# Patient Record
Sex: FEMALE | URBAN - METROPOLITAN AREA
[De-identification: names, ages, dates, MRNs, and addresses within clinical notes are randomized per-mention and may not be internally consistent; named-entity substitution may affect disease eponyms.]

---

## 2017-05-26 ENCOUNTER — IMPORTED ENCOUNTER (OUTPATIENT)
Dept: URBAN - METROPOLITAN AREA CLINIC 1 | Facility: CLINIC | Age: 57
End: 2017-05-26

## 2017-05-26 PROBLEM — H43.22: Noted: 2017-05-26

## 2017-05-26 PROBLEM — H04.123: Noted: 2017-05-26

## 2017-05-26 PROBLEM — H11.153: Noted: 2017-05-26

## 2017-05-26 PROBLEM — R73.09: Noted: 2017-05-26

## 2017-05-26 PROBLEM — H25.813: Noted: 2017-05-26

## 2017-05-26 PROCEDURE — 92014 COMPRE OPH EXAM EST PT 1/>: CPT

## 2017-05-26 PROCEDURE — 92015 DETERMINE REFRACTIVE STATE: CPT

## 2017-05-26 NOTE — PATIENT DISCUSSION
1.  DM Type II (Diet Controlled) without sign of diabetic retinopathy and no blot heme on dilated retinal examination today OU No Macular Edema:  Discussed the pathophysiology of diabetes and its effect on the eye and risk of blindness. Stressed the importance of strong glucose control. Advised of importance of at least yearly dilated examinations but to contact us immediately for any problems or concerns. 2. Cataract OU: Observe for now without intervention. The patient was advised to contact us if any change or worsening of vision3. Dry Eyes OU -- Recommended to patient to use Artificial Tears BID OU4. Pinguecula OU -- Use of sunglasses when exposed to UV light and observation is recommended. 5. Asteroid Hyalosis OS-mild cont to observe 6. Return for an appointment in 1 year for 30. with Dr. Preethi Martinez.

## 2018-05-29 ENCOUNTER — IMPORTED ENCOUNTER (OUTPATIENT)
Dept: URBAN - METROPOLITAN AREA CLINIC 1 | Facility: CLINIC | Age: 58
End: 2018-05-29

## 2018-05-29 PROBLEM — H11.153: Noted: 2018-05-29

## 2018-05-29 PROBLEM — E11.9: Noted: 2018-05-29

## 2018-05-29 PROBLEM — H25.813: Noted: 2018-05-29

## 2018-05-29 PROBLEM — H04.123: Noted: 2018-05-29

## 2018-05-29 PROBLEM — H43.22: Noted: 2018-05-29

## 2018-05-29 PROCEDURE — 92014 COMPRE OPH EXAM EST PT 1/>: CPT

## 2018-05-29 NOTE — PATIENT DISCUSSION
1.  DM Type II (diet controlled) without sign of diabetic retinopathy and no blot heme on dilated retinal examination today OU No Macular Edema: Stable. Discussed the pathophysiology of diabetes and its effect on the eye and risk of blindness. Stressed the importance of strong glucose control. Advised of importance of at least yearly dilated examinations but to contact us immediately for any problems or concerns. 2. Patient defers the refraction at today's visit    3. Cataract OU: Observe for now without intervention. The patient was advised to contact us if any change or worsening of vision4. Pinguecula OU- Stable. Use of sunglasses when exposed to UV light and observation is recommended. 5. Dry Eyes OU- Controlled. The continuation of artificial tears OU BID were recommended routinely. 6.  Asteroid Hyalosis OS- Mild. Stable. Observe. 7. Return for an appointment for a 27 in 1 year with Dr. Nuzhat Kolb.

## 2019-06-24 ENCOUNTER — IMPORTED ENCOUNTER (OUTPATIENT)
Dept: URBAN - METROPOLITAN AREA CLINIC 1 | Facility: CLINIC | Age: 59
End: 2019-06-24

## 2019-06-24 PROBLEM — R73.09: Noted: 2019-06-24

## 2019-06-24 PROBLEM — H43.22: Noted: 2019-06-24

## 2019-06-24 PROBLEM — H25.813: Noted: 2019-06-24

## 2019-06-24 PROBLEM — H11.153: Noted: 2019-06-24

## 2019-06-24 PROBLEM — H04.123: Noted: 2019-06-24

## 2019-06-24 PROCEDURE — 92014 COMPRE OPH EXAM EST PT 1/>: CPT

## 2019-06-24 PROCEDURE — 92015 DETERMINE REFRACTIVE STATE: CPT

## 2019-06-24 NOTE — PATIENT DISCUSSION
1.  DM Type II (Diet Controlled) without sign of diabetic retinopathy and no blot heme on dilated retinal examination today OU No Macular Edema:  Discussed the pathophysiology of diabetes and its effect on the eye and risk of blindness. Stressed the importance of strong glucose control. Advised of importance of at least yearly dilated examinations but to contact us immediately for any problems or concerns. 2. Cataract OU: Observe for now without intervention. The patient was advised to contact us if any change or worsening of vision3. Dry Eyes OU-REC patient to increase using AT up to QID OU. If symptoms worsen or cont will consider Restasis or Xiidra BID OU 4. Pinguecula OU -- Use of sunglasses when exposed to UV light and observation is recommended. 5. Asteroid Hyalosis OS6. Return for an appointment in 1 month for 10/tear lab with Dr. Guera Vincent.

## 2019-06-24 NOTE — PATIENT DISCUSSION
Dry Eyes OU-REC patient to increase using AT up to QID OU.   If symptoms worsen or cont will consider Restasis or Xiidra BID OU

## 2019-07-22 ENCOUNTER — IMPORTED ENCOUNTER (OUTPATIENT)
Dept: URBAN - METROPOLITAN AREA CLINIC 1 | Facility: CLINIC | Age: 59
End: 2019-07-22

## 2019-07-22 PROBLEM — H04.123: Noted: 2019-07-22

## 2019-07-22 PROCEDURE — 83861 MICROFLUID ANALY TEARS: CPT

## 2019-07-22 PROCEDURE — 99213 OFFICE O/P EST LOW 20 MIN: CPT

## 2019-07-22 NOTE — PATIENT DISCUSSION
1.  Dry Eyes OU --Some improvement. Tear lab was done today results was 301 OD and 315 OS. REC patient to cont using AT QID also will start patient on Xiidra BID OU.2. Return for an appointment in 2 months for 10. with Dr. Stephany Miranda.

## 2019-09-16 ENCOUNTER — IMPORTED ENCOUNTER (OUTPATIENT)
Dept: URBAN - METROPOLITAN AREA CLINIC 1 | Facility: CLINIC | Age: 59
End: 2019-09-16

## 2019-09-16 PROBLEM — H04.123: Noted: 2019-09-16

## 2019-09-16 PROBLEM — H16.143: Noted: 2019-09-16

## 2019-09-16 PROCEDURE — 99213 OFFICE O/P EST LOW 20 MIN: CPT

## 2019-09-16 NOTE — PATIENT DISCUSSION
1.  MERRITT w/ PEK OU -- Tear Lab Results: OD- 301 / OS- 315 (7/22/19). Recommend continue the frequent use of OTC AT's TID-QID OU Routinely. Continue Xiidra BID OU (recommend instilling AT's 5 mins prior to instilling Xiidra to help w/ burning symptoms). 2. Nasal & Temporal Pinguecula OU -- Use of sunglasses when exposed to UV light and observation is recommended. 3. Cataracts OU -- Observe for now without intervention. The patient was advised to contact us if any change or worsening of vision. 4. H/o Asteroid Hyalosis OS5. H/o DM Type II (Diet Controlled) w/o h/o DR / DME OU Return for an appointment in 9 MO for a 30 / Tear Lab OU with Dr. Sowmya Dover

## 2020-06-30 ENCOUNTER — IMPORTED ENCOUNTER (OUTPATIENT)
Dept: URBAN - METROPOLITAN AREA CLINIC 1 | Facility: CLINIC | Age: 60
End: 2020-06-30

## 2020-06-30 PROBLEM — H11.153: Noted: 2020-06-30

## 2020-06-30 PROBLEM — H16.143: Noted: 2020-06-30

## 2020-06-30 PROBLEM — H04.123: Noted: 2020-06-30

## 2020-06-30 PROBLEM — H25.813: Noted: 2020-06-30

## 2020-06-30 PROBLEM — R73.09: Noted: 2020-06-30

## 2020-06-30 PROBLEM — H43.22: Noted: 2020-06-30

## 2020-06-30 PROCEDURE — 92015 DETERMINE REFRACTIVE STATE: CPT

## 2020-06-30 PROCEDURE — 92014 COMPRE OPH EXAM EST PT 1/>: CPT

## 2020-06-30 NOTE — PATIENT DISCUSSION
1.  DM Type II (Diet Controlled) without sign of diabetic retinopathy and no blot heme on dilated retinal examination today OU No Macular Edema:  Discussed the pathophysiology of diabetes and its effect on the eye and risk of blindness. Stressed the importance of strong glucose control. Advised of importance of at least yearly dilated examinations but to contact us immediately for any problems or concerns. 2. MERRITT w/ PEK OU-The continuation of Xiidra ou bid and artificial tears were recommended. 3.  Cataract OU: Observe for now without intervention. The patient was advised to contact us if any change or worsening of vision4. Pinguecula OU -- Use of sunglasses when exposed to UV light and observation is recommended. 5. Asteroid Hyalosis OS- observe. 6. Return for an appointment for 1 year for a 30/ tearlab with Dr. Serafin Artis.

## 2021-01-28 NOTE — PROCEDURE NOTE: CLINICAL

## 2021-03-01 NOTE — PROCEDURE NOTE: CLINICAL
PROCEDURE NOTE: Lucentis 0.5mg PFS OS. Diagnosis: Neovascular AMD with Active CNV. Anesthesia: Lidocaine 4%. Prep: Betadine Flush. Prior to injection, risks/benefits/alternatives discussed including but not limited to infection, loss of vision or eye, hemorrhage, cataract, glaucoma, retinal tears or detachment. The patient wished to proceed with treatment. Topical anesthesia was induced with Alcaine. Additional anesthesia was achieved using drop(s) or injection checked above. A drop of Povidone-iodine 5% ophthalmic solution was instilled over the injection site and in the inferior fornix. Betadine prep was performed. A single use prefilled syringe of intravitreal Lucentis 0.5mg/0.05ml was used and excess was disposed of as waste. The needle was passed 3.0 mm posterior to the limbus in pseudophakic patients, and 3.5 mm posterior to the limbus in phakic patients. Injection Time 330. Patient tolerated the procedure well. There were no complications. The eye was irrigated with sterile irrigating solution. Post procedure instructions given. The patient was instructed to use Artificial Tears q.i.d. p.r.n for comfort. The patient was instructed to return for re-evaluation in approximately 4-12 weeks depending on his/her condition and was told to call immediately if vision decreases and/or if his/her eye becomes red, painful, and/or light sensitive. The patient was instructed to go to the emergency room or call 911 if unable to reach the doctor within an hour or two of trying or calling. Sridhar Martinez

## 2021-07-08 ENCOUNTER — IMPORTED ENCOUNTER (OUTPATIENT)
Dept: URBAN - METROPOLITAN AREA CLINIC 1 | Facility: CLINIC | Age: 61
End: 2021-07-08

## 2021-07-08 PROBLEM — H11.153: Noted: 2021-07-08

## 2021-07-08 PROBLEM — H04.123: Noted: 2021-07-08

## 2021-07-08 PROBLEM — H16.143: Noted: 2021-07-08

## 2021-07-08 PROBLEM — H43.813: Noted: 2021-07-08

## 2021-07-08 PROBLEM — H43.811: Noted: 2021-07-08

## 2021-07-08 PROBLEM — H43.22: Noted: 2021-07-08

## 2021-07-08 PROBLEM — H25.813: Noted: 2021-07-08

## 2021-07-08 PROBLEM — H43.812: Noted: 2021-07-08

## 2021-07-08 PROBLEM — R73.09: Noted: 2021-07-08

## 2021-07-08 PROCEDURE — 99214 OFFICE O/P EST MOD 30 MIN: CPT

## 2021-07-08 PROCEDURE — 83861 MICROFLUID ANALY TEARS: CPT

## 2021-07-08 PROCEDURE — 92015 DETERMINE REFRACTIVE STATE: CPT

## 2021-07-08 NOTE — PATIENT DISCUSSION
1.  DM Type II (Diet Controlled) -- without sign of diabetic retinopathy and no blot heme on dilated retinal examination today OU No Macular Edema. Discussed the pathophysiology of diabetes and its effect on the eye and risk of blindness. Stressed the importance of strong glucose control. Advised of importance of at least yearly dilated examinations but to contact us immediately for any problems or concerns. 2. MERRITT w/ PEK OU -- Tearlab today: 331/327. Cont Xiidra BID OU (Erx'd sample & coupon given). Cont ATs TID OU routinely. 3.  Cataract OU -- No progression OU. Non-surgical at this time continue to monitor. The patient was advised to contact us if any change or worsening of vision. 4. Pinguecula OU -- Use of sunglasses when exposed to UV light and observation is recommended. 5. Asteroid Hyalosis OS -- Stable. RD Precautions. 6.  PVD w/o Tear OD -- *New* RD Precautions. MRx for glasses given to patient. Letter to PCP. Return for an appointment in 1 year 27 with Dr. Aleida Dunlap.

## 2022-04-03 ASSESSMENT — TONOMETRY
OD_IOP_MMHG: 15
OS_IOP_MMHG: 15
OD_IOP_MMHG: 17
OD_IOP_MMHG: 16
OD_IOP_MMHG: 15
OS_IOP_MMHG: 16
OS_IOP_MMHG: 13
OD_IOP_MMHG: 16
OS_IOP_MMHG: 15
OD_IOP_MMHG: 17
OS_IOP_MMHG: 17
OD_IOP_MMHG: 13
OS_IOP_MMHG: 16
OS_IOP_MMHG: 17

## 2022-04-03 ASSESSMENT — VISUAL ACUITY
OD_SC: 20/20
OS_SC: 20/20
OD_SC: 20/20-2
OS_SC: 20/20
OS_SC: 20/25
OS_GLARE: 20/30
OD_GLARE: 20/50
OD_CC: J1
OD_CC: J1
OD_SC: 20/20
OS_CC: J1
OS_GLARE: 20/50
OS_SC: 20/20
OD_SC: 20/20
OD_CC: J2
OS_GLARE: 20/60
OD_GLARE: 20/60
OS_CC: J1
OD_SC: 20/20
OS_SC: 20/20
OS_CC: J2
OD_SC: 20/20
OD_SC: 20/20
OS_SC: 20/20
OS_CC: J2
OS_SC: 20/20
OD_GLARE: 20/30
OD_CC: J2

## 2022-07-12 ENCOUNTER — COMPREHENSIVE EXAM (OUTPATIENT)
Dept: URBAN - METROPOLITAN AREA CLINIC 1 | Facility: CLINIC | Age: 62
End: 2022-07-12

## 2022-07-12 DIAGNOSIS — H25.813: ICD-10-CM

## 2022-07-12 DIAGNOSIS — H43.813: ICD-10-CM

## 2022-07-12 DIAGNOSIS — H43.22: ICD-10-CM

## 2022-07-12 DIAGNOSIS — H11.153: ICD-10-CM

## 2022-07-12 DIAGNOSIS — E11.9: ICD-10-CM

## 2022-07-12 DIAGNOSIS — H04.123: ICD-10-CM

## 2022-07-12 DIAGNOSIS — H16.143: ICD-10-CM

## 2022-07-12 PROCEDURE — 92014 COMPRE OPH EXAM EST PT 1/>: CPT

## 2022-07-12 ASSESSMENT — VISUAL ACUITY
OD_BAT: 20/60
OD_CC: 20/25
OS_CC: 20/25
OS_BAT: 20/60

## 2022-07-12 ASSESSMENT — TONOMETRY
OS_IOP_MMHG: 17
OD_IOP_MMHG: 17

## 2023-07-13 ENCOUNTER — COMPREHENSIVE EXAM (OUTPATIENT)
Dept: URBAN - METROPOLITAN AREA CLINIC 1 | Facility: CLINIC | Age: 63
End: 2023-07-13

## 2023-07-13 DIAGNOSIS — H25.813: ICD-10-CM

## 2023-07-13 DIAGNOSIS — E11.9: ICD-10-CM

## 2023-07-13 PROCEDURE — 92015 DETERMINE REFRACTIVE STATE: CPT

## 2023-07-13 PROCEDURE — 92014 COMPRE OPH EXAM EST PT 1/>: CPT

## 2023-07-13 ASSESSMENT — TONOMETRY
OS_IOP_MMHG: 17
OD_IOP_MMHG: 17

## 2023-07-13 ASSESSMENT — VISUAL ACUITY
OD_CC: J1
OD_CC: 20/20
OS_CC: J1
OS_CC: 20/30

## 2024-07-15 ENCOUNTER — COMPREHENSIVE EXAM (OUTPATIENT)
Dept: URBAN - METROPOLITAN AREA CLINIC 1 | Facility: CLINIC | Age: 64
End: 2024-07-15

## 2024-07-15 DIAGNOSIS — H25.813: ICD-10-CM

## 2024-07-15 DIAGNOSIS — E11.9: ICD-10-CM

## 2024-07-15 DIAGNOSIS — H43.813: ICD-10-CM

## 2024-07-15 DIAGNOSIS — H04.123: ICD-10-CM

## 2024-07-15 PROCEDURE — 99214 OFFICE O/P EST MOD 30 MIN: CPT

## 2024-07-15 PROCEDURE — 92015 DETERMINE REFRACTIVE STATE: CPT

## 2024-07-15 RX ORDER — LIFITEGRAST 50 MG/ML: 1 SOLUTION/ DROPS OPHTHALMIC TWICE A DAY

## 2024-07-15 ASSESSMENT — TONOMETRY
OS_IOP_MMHG: 18
OD_IOP_MMHG: 18

## 2024-07-15 ASSESSMENT — VISUAL ACUITY
OS_BAT: 20/60
OD_CC: 20/20-1
OS_CC: 20/20-2
OD_BAT: 20/25

## 2025-07-15 ENCOUNTER — COMPREHENSIVE EXAM (OUTPATIENT)
Age: 65
End: 2025-07-15

## 2025-07-15 DIAGNOSIS — H11.153: ICD-10-CM

## 2025-07-15 DIAGNOSIS — H25.813: ICD-10-CM

## 2025-07-15 DIAGNOSIS — H16.143: ICD-10-CM

## 2025-07-15 DIAGNOSIS — H43.813: ICD-10-CM

## 2025-07-15 DIAGNOSIS — H04.123: ICD-10-CM

## 2025-07-15 DIAGNOSIS — E11.9: ICD-10-CM

## 2025-07-15 DIAGNOSIS — H43.23: ICD-10-CM

## 2025-07-15 PROCEDURE — 99214 OFFICE O/P EST MOD 30 MIN: CPT

## 2025-07-15 PROCEDURE — 92250 FUNDUS PHOTOGRAPHY W/I&R: CPT

## 2025-08-19 ENCOUNTER — COMPREHENSIVE EXAM (OUTPATIENT)
Age: 65
End: 2025-08-19

## 2025-08-19 DIAGNOSIS — H52.4: ICD-10-CM

## 2025-08-19 DIAGNOSIS — H52.223: ICD-10-CM

## 2025-08-19 DIAGNOSIS — H52.13: ICD-10-CM

## 2025-08-19 DIAGNOSIS — Z01.00: ICD-10-CM

## 2025-08-19 PROCEDURE — 92015 DETERMINE REFRACTIVE STATE: CPT

## 2025-08-19 PROCEDURE — 92014 COMPRE OPH EXAM EST PT 1/>: CPT
